# Patient Record
Sex: FEMALE | URBAN - METROPOLITAN AREA
[De-identification: names, ages, dates, MRNs, and addresses within clinical notes are randomized per-mention and may not be internally consistent; named-entity substitution may affect disease eponyms.]

---

## 2019-03-07 ENCOUNTER — APPOINTMENT (RX ONLY)
Dept: URBAN - METROPOLITAN AREA CLINIC 100 | Facility: CLINIC | Age: 59
Setting detail: DERMATOLOGY
End: 2019-03-07

## 2019-03-07 DIAGNOSIS — Z41.9 ENCOUNTER FOR PROCEDURE FOR PURPOSES OTHER THAN REMEDYING HEALTH STATE, UNSPECIFIED: ICD-10-CM

## 2019-03-07 PROBLEM — Z85.828 PERSONAL HISTORY OF OTHER MALIGNANT NEOPLASM OF SKIN: Status: ACTIVE | Noted: 2019-03-07

## 2019-03-07 PROBLEM — E78.5 HYPERLIPIDEMIA, UNSPECIFIED: Status: ACTIVE | Noted: 2019-03-07

## 2019-03-07 PROBLEM — D48.5 NEOPLASM OF UNCERTAIN BEHAVIOR OF SKIN: Status: ACTIVE | Noted: 2019-03-07

## 2019-03-07 PROBLEM — F41.9 ANXIETY DISORDER, UNSPECIFIED: Status: ACTIVE | Noted: 2019-03-07

## 2019-03-07 PROBLEM — E03.9 HYPOTHYROIDISM, UNSPECIFIED: Status: ACTIVE | Noted: 2019-03-07

## 2019-03-07 PROBLEM — I10 ESSENTIAL (PRIMARY) HYPERTENSION: Status: ACTIVE | Noted: 2019-03-07

## 2019-03-07 PROCEDURE — ? RECOMMENDATIONS

## 2019-03-07 PROCEDURE — ? COSMETIC CONSULTATION: FILLERS

## 2019-03-07 ASSESSMENT — LOCATION SIMPLE DESCRIPTION DERM
LOCATION SIMPLE: LEFT CHEEK
LOCATION SIMPLE: RIGHT CHEEK

## 2019-03-07 ASSESSMENT — LOCATION ZONE DERM: LOCATION ZONE: FACE

## 2019-03-07 ASSESSMENT — LOCATION DETAILED DESCRIPTION DERM
LOCATION DETAILED: RIGHT SUPERIOR LATERAL MALAR CHEEK
LOCATION DETAILED: LEFT INFERIOR MEDIAL MALAR CHEEK
LOCATION DETAILED: RIGHT INFERIOR MEDIAL MALAR CHEEK
LOCATION DETAILED: LEFT SUPERIOR CENTRAL MALAR CHEEK

## 2019-03-07 NOTE — PROCEDURE: RECOMMENDATIONS
Detail Level: Zone
Recommendations (Free Text): Juvederm ultra plus \\nJuve ultra xc \\nRestylane lyft x1\\n$150 off
Recommendation Preamble: The following recommendations were made during the visit:

## 2019-03-14 ENCOUNTER — APPOINTMENT (RX ONLY)
Dept: URBAN - METROPOLITAN AREA CLINIC 100 | Facility: CLINIC | Age: 59
Setting detail: DERMATOLOGY
End: 2019-03-14

## 2019-03-14 DIAGNOSIS — Z41.9 ENCOUNTER FOR PROCEDURE FOR PURPOSES OTHER THAN REMEDYING HEALTH STATE, UNSPECIFIED: ICD-10-CM

## 2019-03-14 PROCEDURE — ? JUVEDERM ULTRA XC INJECTION

## 2019-03-14 PROCEDURE — ? RESTYLANE LYFT INJECTION

## 2019-03-14 PROCEDURE — ? JUVEDERM ULTRA PLUS XC INJECTION

## 2019-03-14 ASSESSMENT — LOCATION SIMPLE DESCRIPTION DERM
LOCATION SIMPLE: LEFT CHEEK
LOCATION SIMPLE: RIGHT CHEEK

## 2019-03-14 ASSESSMENT — LOCATION ZONE DERM: LOCATION ZONE: FACE

## 2019-03-14 ASSESSMENT — LOCATION DETAILED DESCRIPTION DERM
LOCATION DETAILED: RIGHT LATERAL MALAR CHEEK
LOCATION DETAILED: LEFT LATERAL MALAR CHEEK
LOCATION DETAILED: LEFT INFERIOR CENTRAL MALAR CHEEK
LOCATION DETAILED: LEFT CENTRAL MALAR CHEEK
LOCATION DETAILED: RIGHT CENTRAL MALAR CHEEK

## 2019-03-14 NOTE — PROCEDURE: RESTYLANE LYFT INJECTION
Price (Use Numbers Only, No Special Characters Or $): 336 Price (Use Numbers Only, No Special Characters Or $): 737

## 2020-03-03 ENCOUNTER — APPOINTMENT (RX ONLY)
Dept: URBAN - METROPOLITAN AREA CLINIC 100 | Facility: CLINIC | Age: 60
Setting detail: DERMATOLOGY
End: 2020-03-03

## 2020-03-03 DIAGNOSIS — Z41.9 ENCOUNTER FOR PROCEDURE FOR PURPOSES OTHER THAN REMEDYING HEALTH STATE, UNSPECIFIED: ICD-10-CM

## 2020-03-03 PROCEDURE — ? COSMETIC CONSULTATION: FILLERS

## 2020-03-03 PROCEDURE — ? RECOMMENDATIONS

## 2020-03-03 ASSESSMENT — LOCATION SIMPLE DESCRIPTION DERM
LOCATION SIMPLE: LEFT LIP
LOCATION SIMPLE: RIGHT CHEEK

## 2020-03-03 ASSESSMENT — LOCATION DETAILED DESCRIPTION DERM
LOCATION DETAILED: LEFT LOWER CUTANEOUS LIP
LOCATION DETAILED: LEFT UPPER CUTANEOUS LIP
LOCATION DETAILED: RIGHT INFERIOR MEDIAL MALAR CHEEK

## 2020-03-03 ASSESSMENT — LOCATION ZONE DERM
LOCATION ZONE: FACE
LOCATION ZONE: LIP

## 2020-03-03 NOTE — PROCEDURE: RECOMMENDATIONS
Detail Level: Zone
Recommendations (Free Text): Around mouth/lips -1 syringe volbella, 1 juvederm plus. $1250\\nPromotion: Buy two get 20 units free. \\n\\nMicroneedling was talked about for fine lines around mouth in conjunction with filler.

## 2021-02-12 ENCOUNTER — APPOINTMENT (RX ONLY)
Dept: URBAN - METROPOLITAN AREA CLINIC 100 | Facility: CLINIC | Age: 61
Setting detail: DERMATOLOGY
End: 2021-02-12

## 2021-02-12 DIAGNOSIS — Z41.9 ENCOUNTER FOR PROCEDURE FOR PURPOSES OTHER THAN REMEDYING HEALTH STATE, UNSPECIFIED: ICD-10-CM | Status: INADEQUATELY CONTROLLED

## 2021-02-12 PROCEDURE — ? COSMETIC CONSULTATION: FILLERS

## 2021-02-12 PROCEDURE — ? RECOMMENDATIONS

## 2021-02-12 ASSESSMENT — LOCATION DETAILED DESCRIPTION DERM
LOCATION DETAILED: RIGHT SUPERIOR CENTRAL MALAR CHEEK
LOCATION DETAILED: LEFT UPPER CUTANEOUS LIP
LOCATION DETAILED: LEFT SUPERIOR CENTRAL MALAR CHEEK
LOCATION DETAILED: RIGHT UPPER CUTANEOUS LIP

## 2021-02-12 ASSESSMENT — LOCATION ZONE DERM
LOCATION ZONE: LIP
LOCATION ZONE: FACE

## 2021-02-12 ASSESSMENT — LOCATION SIMPLE DESCRIPTION DERM
LOCATION SIMPLE: RIGHT LIP
LOCATION SIMPLE: RIGHT CHEEK
LOCATION SIMPLE: LEFT LIP
LOCATION SIMPLE: LEFT CHEEK

## 2021-02-12 NOTE — PROCEDURE: RECOMMENDATIONS
Recommendations (Free Text): Patient has budget $1600\\nRecommended Voluma for jawline and cheeks. Patient also interested in filler around mouth.
Detail Level: Detailed
Render Risk Assessment In Note?: no

## 2022-02-11 ENCOUNTER — APPOINTMENT (RX ONLY)
Dept: URBAN - METROPOLITAN AREA CLINIC 100 | Facility: CLINIC | Age: 62
Setting detail: DERMATOLOGY
End: 2022-02-11

## 2022-02-11 DIAGNOSIS — Z41.9 ENCOUNTER FOR PROCEDURE FOR PURPOSES OTHER THAN REMEDYING HEALTH STATE, UNSPECIFIED: ICD-10-CM

## 2022-02-11 PROCEDURE — ? COSMETIC CONSULTATION: FILLERS

## 2022-02-11 PROCEDURE — ? COSMETIC CONSULTATION: BOTOX

## 2022-02-11 ASSESSMENT — LOCATION SIMPLE DESCRIPTION DERM
LOCATION SIMPLE: LEFT CHEEK
LOCATION SIMPLE: LEFT EYELID
LOCATION SIMPLE: RIGHT CHEEK

## 2022-02-11 ASSESSMENT — LOCATION DETAILED DESCRIPTION DERM
LOCATION DETAILED: RIGHT SUPERIOR LATERAL MALAR CHEEK
LOCATION DETAILED: RIGHT SUPERIOR MEDIAL BUCCAL CHEEK
LOCATION DETAILED: LEFT LATERAL CANTHUS
LOCATION DETAILED: LEFT SUPERIOR CENTRAL BUCCAL CHEEK

## 2022-02-11 ASSESSMENT — LOCATION ZONE DERM
LOCATION ZONE: FACE
LOCATION ZONE: EYELID

## 2022-02-18 ENCOUNTER — APPOINTMENT (RX ONLY)
Dept: URBAN - METROPOLITAN AREA CLINIC 100 | Facility: CLINIC | Age: 62
Setting detail: DERMATOLOGY
End: 2022-02-18

## 2022-02-18 DIAGNOSIS — Z41.9 ENCOUNTER FOR PROCEDURE FOR PURPOSES OTHER THAN REMEDYING HEALTH STATE, UNSPECIFIED: ICD-10-CM

## 2022-02-18 PROCEDURE — ? JUVEDERM VOLUMA XC INJECTION

## 2022-02-18 PROCEDURE — ? BOTOX (U OR CC)

## 2022-02-18 PROCEDURE — ? JUVEDERM ULTRA XC INJECTION

## 2022-02-18 PROCEDURE — ? OTHER

## 2022-02-18 ASSESSMENT — LOCATION DETAILED DESCRIPTION DERM
LOCATION DETAILED: RIGHT SUPERIOR MEDIAL BUCCAL CHEEK
LOCATION DETAILED: LEFT INFERIOR MEDIAL MALAR CHEEK
LOCATION DETAILED: LEFT INFERIOR TEMPLE
LOCATION DETAILED: RIGHT INFERIOR TEMPLE
LOCATION DETAILED: LEFT SUPERIOR MEDIAL BUCCAL CHEEK
LOCATION DETAILED: RIGHT SUPERIOR LATERAL MALAR CHEEK
LOCATION DETAILED: LEFT CENTRAL MALAR CHEEK
LOCATION DETAILED: RIGHT INFERIOR MEDIAL MALAR CHEEK
LOCATION DETAILED: RIGHT CENTRAL MALAR CHEEK
LOCATION DETAILED: LEFT SUPERIOR LATERAL MALAR CHEEK

## 2022-02-18 ASSESSMENT — LOCATION SIMPLE DESCRIPTION DERM
LOCATION SIMPLE: LEFT TEMPLE
LOCATION SIMPLE: LEFT CHEEK
LOCATION SIMPLE: RIGHT CHEEK
LOCATION SIMPLE: RIGHT TEMPLE

## 2022-02-18 ASSESSMENT — LOCATION ZONE DERM: LOCATION ZONE: FACE

## 2022-02-18 NOTE — PROCEDURE: JUVEDERM ULTRA XC INJECTION
Price (Use Numbers Only, No Special Characters Or $): 071 Price (Use Numbers Only, No Special Characters Or $): 472

## 2022-02-18 NOTE — PROCEDURE: OTHER
Other (Free Text): 10 units gifted to patient. Provided by the rep
Render Risk Assessment In Note?: no
Detail Level: Zone
Note Text (......Xxx Chief Complaint.): This diagnosis correlates with the

## 2024-08-02 ENCOUNTER — APPOINTMENT (RX ONLY)
Dept: URBAN - METROPOLITAN AREA CLINIC 102 | Facility: CLINIC | Age: 64
Setting detail: DERMATOLOGY
End: 2024-08-02

## 2024-08-02 DIAGNOSIS — Z41.9 ENCOUNTER FOR PROCEDURE FOR PURPOSES OTHER THAN REMEDYING HEALTH STATE, UNSPECIFIED: ICD-10-CM

## 2024-08-02 PROCEDURE — ? COSMETIC CONSULTATION: COOLPEEL

## 2024-08-02 PROCEDURE — ? RECOMMENDATIONS

## 2024-08-02 PROCEDURE — ? COSMETIC CONSULTATION: BOTOX

## 2024-08-02 NOTE — PROCEDURE: COSMETIC CONSULTATION: COOLPEEL
Patient Specific Counseling (Will Not Stick From Patient To Patient): Patient quoted $800 for August promotion for combination settings for cool peel/catalino. She was recommended to get higher settings around mouth and normal settings around the rest of the face. She was quoted an additional $200 to include neck. Informed patient that 3 treatments are recommended for best results. Patient opted for one appointment at this time. She as instructed to come in 15-20 minutes early for numbing, and 20 minutes for the cool peel itself.
Detail Level: Detailed

## 2024-08-02 NOTE — PROCEDURE: RECOMMENDATIONS
Render Risk Assessment In Note?: no
Detail Level: Detailed
Recommendations (Free Text): Patient was also informed about Sculptra because of her reluctance to get filler again. It was explained that it is a more natural, but lengthier option for her. She was informed that Sculptra would last at least 2 years. She was told that she would need for 5-6 vials over 3 or 4 month, every 4-6 weeks. Quote was at $800 per vial.